# Patient Record
Sex: MALE | Race: WHITE | Employment: OTHER | URBAN - METROPOLITAN AREA
[De-identification: names, ages, dates, MRNs, and addresses within clinical notes are randomized per-mention and may not be internally consistent; named-entity substitution may affect disease eponyms.]

---

## 2020-12-19 ENCOUNTER — HOSPITAL ENCOUNTER (EMERGENCY)
Facility: CLINIC | Age: 50
Discharge: HOME | End: 2020-12-20
Attending: EMERGENCY MEDICINE
Payer: COMMERCIAL

## 2020-12-19 PROCEDURE — 99283 EMERGENCY DEPT VISIT LOW MDM: CPT

## 2020-12-19 PROCEDURE — 12015 RPR F/E/E/N/L/M 7.6-12.5 CM: CPT

## 2020-12-19 ASSESSMENT — PAIN DESCRIPTION - ORIENTATION: ORIENTATION: RIGHT

## 2020-12-19 ASSESSMENT — PAIN SCALES - GENERAL: PAINLEVEL_OUTOF10: 0

## 2020-12-19 ASSESSMENT — PAIN DESCRIPTION - LOCATION: LOCATION: EAR

## 2020-12-20 VITALS
HEART RATE: 100 BPM | HEIGHT: 71 IN | RESPIRATION RATE: 18 BRPM | BODY MASS INDEX: 28.7 KG/M2 | OXYGEN SATURATION: 97 % | WEIGHT: 205 LBS | SYSTOLIC BLOOD PRESSURE: 145 MMHG | TEMPERATURE: 98.2 F | DIASTOLIC BLOOD PRESSURE: 100 MMHG

## 2020-12-20 PROCEDURE — 6370000000 HC RX 637 (ALT 250 FOR IP): Performed by: EMERGENCY MEDICINE

## 2020-12-20 RX ORDER — DOXYCYCLINE 100 MG/1
100 CAPSULE ORAL ONCE
Status: COMPLETED | OUTPATIENT
Start: 2020-12-20 | End: 2020-12-20

## 2020-12-20 RX ORDER — DOXYCYCLINE HYCLATE 100 MG
100 TABLET ORAL 2 TIMES DAILY
Qty: 16 TABLET | Refills: 0 | Status: SHIPPED | OUTPATIENT
Start: 2020-12-20 | End: 2020-12-28

## 2020-12-20 RX ADMIN — DOXYCYCLINE 100 MG: 100 CAPSULE ORAL at 01:01

## 2020-12-20 ASSESSMENT — ENCOUNTER SYMPTOMS
SORE THROAT: 0
VOMITING: 0
DIARRHEA: 0
ABDOMINAL PAIN: 0
BACK PAIN: 0
NAUSEA: 0
SHORTNESS OF BREATH: 0

## 2020-12-20 NOTE — ED PROVIDER NOTES
INITIAL VITALS:  height is 5' 11\" (1.803 m) and weight is 93 kg (205 lb). His oral temperature is 98.2 °F (36.8 °C). His blood pressure is 145/100 (abnormal) and his pulse is 100. His respiration is 18 and oxygen saturation is 97%. Physical Exam   Constitutional: He appears well-developed and well-nourished. No distress. HENT:   Head: Normocephalic and atraumatic. Right Ear: External ear normal.   Left Ear: External ear normal.   Ears:    Laceration through the helix and into the antihelix of the right ear as shown on the picture. Also a second laceration as shown on the picture. The laceration through the helix did [place] through the cartilage approximately 0.5 cm. The laceration to the helix is 5 cm in total length. Some areas were macerated. The second laceration is 3 cm in total length. Minimally contaminated. Otherwise unremarkable exam.   Eyes: Lids are normal. Right eye exhibits no discharge. Left eye exhibits no discharge. Neck: No tracheal deviation present. Cardiovascular: Normal rate and regular rhythm. Pulmonary/Chest: Effort normal. No accessory muscle usage. No tachypnea. No respiratory distress. Abdominal: Soft. There is no abdominal tenderness. Neurological: He is alert. GCS eye subscore is 4. GCS verbal subscore is 5. GCS motor subscore is 6. Skin: Skin is warm and dry. Psychiatric: He has a normal mood and affect. His behavior is normal.           DIFFERENTIAL DIAGNOSIS/ MDM:     Plan to be to thoroughly cleanse and suture the wound. I did speak with Dr. Rosa Maria Todd on-call for plastics. He did see pictures of the wound. He is comfortable with me suturing and he will follow up with the patient in the office. He did asked that we put the patient antibiotics as well and doxycycline was chosen. Patient is allergic to penicillin. I do not feel any imaging is necessary at this time.     DIAGNOSTIC RESULTS EKG: All EKG's are interpreted by the Emergency Department Physician who either signs or Co-signs this chart in the absence of a cardiologist.        RADIOLOGY:   I directly visualized the following  images and reviewed the radiologist interpretations:  No orders to display       No results found. LABS:  No results found for this visit on 12/19/20. EMERGENCY DEPARTMENT COURSE:     The patient was given the following medications:  Orders Placed This Encounter   Medications    doxycycline hyclate (VIBRA-TABS) 100 MG tablet     Sig: Take 1 tablet by mouth 2 times daily for 8 days     Dispense:  16 tablet     Refill:  0    doxycycline monohydrate (MONODOX) capsule 100 mg     Order Specific Question:   Antimicrobial Indications     Answer:   Skin and Soft Tissue Infection        Vitals:    Vitals:    12/19/20 2250 12/20/20 0059   BP: (!) 197/102 (!) 145/100   Pulse: 104 100   Resp: 18 18   Temp: 98.2 °F (36.8 °C)    TempSrc: Oral    SpO2: 95% 97%   Weight: 93 kg (205 lb)    Height: 5' 11\" (1.803 m)      -------------------------  BP: (!) 145/100, Temp: 98.2 °F (36.8 °C), Pulse: 100, Resp: 18      Re-evaluation Notes    Patient tolerated the procedure very well.  20 total sutures placed. Was a fairly complex laceration however good approximation was achieved throughout the entire ear. It was thoroughly cleansed with chlorhexidine and water. Patient advised to follow-up with plastic surgery given follow-up information. Advised to watch for signs of infection. Advised return for any new or concerning symptoms. They are comfortable with the plan.     CONSULTS:    Plastic surgery    CRITICAL CARE:     None    PROCEDURES:    Lac Repair    Date/Time: 12/20/2020 12:00 AM  Performed by: Verena Bauer DO  Authorized by: Verena Bauer DO     Consent:     Consent obtained:  Verbal    Consent given by:  Patient and spouse Risks discussed:  Need for additional repair, poor cosmetic result and poor wound healing  Anesthesia (see MAR for exact dosages): Anesthesia method:  Local infiltration    Local anesthetic:  Lidocaine 1% w/o epi (2ml)  Repair type:     Repair type: Intermediate  Pre-procedure details:     Preparation:  Patient was prepped and draped in usual sterile fashion  Treatment:     Area cleansed with:  Hibiclens    Amount of cleaning:  Extensive    Irrigation solution:  Sterile water    Irrigation method:  Pressure wash  Skin repair:     Repair method:  Sutures    Suture size:  5-0    Suture material:  Nylon    Suture technique:  Simple interrupted    Number of sutures:  20  Approximation:     Approximation:  Close  Post-procedure details:     Dressing:  Non-adherent dressing    Patient tolerance of procedure: Tolerated well, no immediate complications  Comments:      One 5-0 Vicryl suture was used to align and stabilize the cartilage. It was aligned very well. The rest of the sutures were 50 nylon. FINAL IMPRESSION      1.  Complex laceration of right ear, initial encounter          DISPOSITION/PLAN   DISPOSITION Decision To Discharge 12/20/2020 12:54:09 AM      Condition on Disposition    Improved    PATIENT REFERRED TO:  Cody Chambers MD  [address] [address]    Schedule an appointment as soon as possible for a visit       Justo Byrd MD  [address] [address] [id number] [phone]    Schedule an appointment as soon as possible for a visit in 3 days        DISCHARGE MEDICATIONS:  Discharge Medication List as of 12/20/2020 12:56 AM      START taking these medications    Details   doxycycline hyclate (VIBRA-TABS) 100 MG tablet Take 1 tablet by mouth 2 times daily for 8 days, Disp-16 tablet, R-0Print (Please note that portions of this note were completed with a voice recognition program.  Efforts were made to edit the dictations but occasionally words are mis-transcribed.)    Junior Moon DO  Attending Emergency Physician       Junior Moon DO  12/20/20 0206

## 2020-12-20 NOTE — ED NOTES
Contacted [facility] access for Ubaldo's, page is out for consult.       Francesca Sparrow RN  12/19/20 2148

## 2020-12-22 PROBLEM — S01.311A COMPLEX LACERATION OF RIGHT EAR: Status: ACTIVE | Noted: 2020-12-22

## 2022-01-11 ENCOUNTER — HOSPITAL ENCOUNTER (EMERGENCY)
Facility: CLINIC | Age: 52
Discharge: HOME | End: 2022-01-11
Attending: EMERGENCY MEDICINE
Payer: COMMERCIAL

## 2022-01-11 VITALS
BODY MASS INDEX: 28.7 KG/M2 | WEIGHT: 205 LBS | TEMPERATURE: 98.5 F | HEIGHT: 71 IN | DIASTOLIC BLOOD PRESSURE: 90 MMHG | OXYGEN SATURATION: 99 % | RESPIRATION RATE: 16 BRPM | SYSTOLIC BLOOD PRESSURE: 157 MMHG | HEART RATE: 73 BPM

## 2022-01-11 DIAGNOSIS — S05.02XA ABRASION OF LEFT CORNEA, INITIAL ENCOUNTER: Primary | ICD-10-CM

## 2022-01-11 PROCEDURE — 99284 EMERGENCY DEPT VISIT MOD MDM: CPT

## 2022-01-11 PROCEDURE — 6370000000 HC RX 637 (ALT 250 FOR IP)

## 2022-01-11 RX ORDER — NEOMYCIN SULFATE, POLYMYXIN B SULFATE AND GRAMICIDIN 1.75; 10000; .025 MG/ML; [USP'U]/ML; MG/ML
2 SOLUTION/ DROPS OPHTHALMIC ONCE
Status: COMPLETED | OUTPATIENT
Start: 2022-01-11 | End: 2022-01-11

## 2022-01-11 RX ORDER — TETRACAINE HYDROCHLORIDE 5 MG/ML
1 SOLUTION OPHTHALMIC ONCE
Status: COMPLETED | OUTPATIENT
Start: 2022-01-11 | End: 2022-01-11

## 2022-01-11 RX ADMIN — FLUORESCEIN SODIUM 1 EACH: 0.6 STRIP OPHTHALMIC at 16:47

## 2022-01-11 RX ADMIN — NEOMYCIN SULFATE, POLYMYXIN B SULFATE AND GRAMICIDIN 2 DROP: 1.75; 10000; .025 SOLUTION/ DROPS OPHTHALMIC at 16:53

## 2022-01-11 RX ADMIN — TETRACAINE HYDROCHLORIDE 1 DROP: 5 SOLUTION OPHTHALMIC at 16:47

## 2022-01-11 ASSESSMENT — PAIN SCALES - GENERAL: PAINLEVEL_OUTOF10: 1

## 2022-01-11 ASSESSMENT — PAIN DESCRIPTION - LOCATION: LOCATION: EYE

## 2022-01-11 ASSESSMENT — PAIN DESCRIPTION - ORIENTATION: ORIENTATION: LEFT

## 2022-01-11 NOTE — ED PROVIDER NOTES
[facility]  [address] [facility]  Phone: [phone]        Pt Name: Ria Lopez  MRN: [id number]  [place] 1970  Date of evaluation: 1/11/22    [address] [place] [address]       Chief Complaint   Patient presents with    Eye Pain       HISTORY OF PRESENT ILLNESS (Location/Symptom, Timing/Onset, Context/Setting, Quality, Duration, Modifying Factors, Severity)      Ria Lopez is a 46 y.o. male presents to the ED via private auto with plaints of left eye pain. He states that about 5 hours ago he was sitting in his office doing paperwork when he noticed his left eye started to feel irritated. He denies any specific injury. He reports a foreign body sensation with constant irritation and tearing. He denies blurred vision. He is up-to-date on his tetanus. PAST MEDICAL / SURGICAL / SOCIAL / FAMILY HISTORY     PMH:  has a past medical history of COVID-19 and Hypertension. Surgical History:  has a past surgical history that includes Hand tendon surgery (Left). Social History:  reports that he has never smoked. He has quit using smokeless tobacco.  His smokeless tobacco use included chew. He reports current alcohol use. He reports that he does not use drugs. Family History: has no family status information on file. family history is not on file. Psychiatric History: None    Allergies: Penicillins    Home Medications:   Prior to Admission medications    Medication Sig Start Date End Date Taking? Authorizing Provider   Multiple Vitamins-Minerals (THERAPEUTIC MULTIVITAMIN-MINERALS) tablet Take 1 tablet by mouth daily    Historical Provider, MD       REVIEW OF SYSTEMS  (2-9 systems for level 4, 10 ormore for level 5)      Review of Systems    Constitutional: See HPI. Denies fever or chills. Eyes: Reports left eye irritation with foreign body sensation. Denies vision changes. HENT: Denies sore throat or neck pain.    Respiratory: Denies cough or shortness of breath. Cardiovascular: Denies chest pain. GI: Denies vomiting or diarrhea. : Denies painful urination. Musculoskeletal: Denies recent trauma. Skin: Denies new rashes or wounds. Neurologic:  Denies new numbness or weakness. Psychiatric: Denies sleep disturbances. Endocrine:  Denies unexpected weight loss  Heme: Denies bleeding disorders. All other systems negative except as marked. PHYSICAL EXAM  (up to 7 for level 4, 8 or more for level 5)      INITIAL VITALS:  height is 5' 11\" (1.803 m) and weight is 93 kg (205 lb). His temperature is 98.5 °F (36.9 °C). His blood pressure is 157/90 (abnormal) and his pulse is 73. His respiration is 16 and oxygen saturation is 99%. Vital signs reviewed. Physical Exam    General:  Alert, cooperative, well-groomed, well-nourished, appears stated age, and is in no acute distress. Head:  Normocephalic, atraumatic, and without obvious abnormality. Eyes:  Sclerae/conjunctivae clear without injection, pallor, or icterus. Corneas clear without opacities. EOM's intact. ENT: Ears and nose are all without obvious masses lesion or deformity. Neck: Supple and symmetrical. Trachea midline. No adenopathy. No jugular venous distention. Lungs:   No respiratory distress. Clear to auscultation bilaterally. No wheezes, rhonchi, or rales. Heart:  Regular rate. Regular rhythm. No murmurs, rubs, or gallops. Abdomen:   Normoactive bowel sounds. Soft, nontender, nondistended without guarding or rebound. No palpable masses. No CVA tenderness. Extremities: Warm and dry without erythema or edema. Skin: Soft, good turgor, and well-hydrated. No obvious rashes or lesions. Neurologic: GCS is 15 and no focal deficits are appreciated. Normal gait. Grossly normal motor and sensation. Speech clear. Psychiatric: Normal mood and affect. Normal behavior. Coherent thought process.      DIFFERENTIAL DIAGNOSIS / MDM   Corneal abrasion versus foreign body to left eye    PLAN (LABS / IMAGING / EKG):  No orders of the defined types were placed in this encounter. MEDICATIONS ORDERED:  Orders Placed This Encounter   Medications    tetracaine (TETRAVISC) 0.5 % ophthalmic solution 1 drop    fluorescein ophthalmic strip 1 each    neomycin-polymyxin-gramicidin (NEOSPORIN) 1.75-52779-. 025 ophthalmic solution 2 drop       Controlled Substances Monitoring:     DIAGNOSTIC RESULTS     EKG: All EKG's are interpreted by the Emergency Department Physician who either signs or Co-signs this chart in the [address] a cardiologist.  Not indicated    RADIOLOGY: All images are read by the radiologist and their interpretations are reviewed. No results found. LABS:  No results found for this visit on 01/11/22. EMERGENCY DEPARTMENT COURSE     Will complete eye examination with fluorescein, tetracaine and [place] lamp. Corneal abrasion noted to left eye at 9:00 per [facility] lamp exam.  Will treat with Neosporin eyedrops 2 drops to left eye 4 times daily for 5 days will initiate in the emergency room. We discussed taking ibuprofen/acetaminophen for pain control and following up with primary care physician or ophthalmologist for continued complaints or increased blurred vision. Vitals:    Vitals:    01/11/22 1627   BP: (!) 157/90   Pulse: 73   Resp: 16   Temp: 98.5 °F (36.9 °C)   SpO2: 99%   Weight: 93 kg (205 lb)   Height: 5' 11\" (1.803 m)     -------------------------  BP: (!) 157/90, Temp: 98.5 °F (36.9 °C), Pulse: 73, Resp: 16      RE-EVALUATION:  See ED Course notes above. The patient and/or family and I have discussed the diagnosis and risks, and we agree with discharging home to follow-up with their pertinent providers. The patient appears stable for discharge and has been instructed to return immediately for new concerning symptoms or if the symptoms worsen in any way.  The patient understands that at this time there is no evidence for a more malignant underlying process, but the patient also understands that early in the process of an illness or injury, an emergency department workup can be falsely reassuring. Routine discharge counseling was given, and the patient understands that worsening, changing or persistent symptoms should prompt an immediate call or follow up with their primary physician or return to the emergency department. I have reviewed the disposition diagnosis with the patient and or their family/guardian. I have answered their questions and given discharge instructions. They voiced understanding of these instructions and did not have any further questions or complaints. This patient was seen by the attending physician and they agreed with the assessment and plan. CONSULTS:  None    PROCEDURES:  None    FINAL IMPRESSION      1. Abrasion of left cornea, initial encounter          DISPOSITION / PLAN     CONDITION ON DISPOSITION:   Good / Stable for discharge.      PATIENT REFERRED TO:  Lydia Wiggins MD  [address] [address] [address]    Call in 2 days      [facility]  / [facility]  [phone]    As needed      DISCHARGE MEDICATIONS:  Discharge Medication List as of 1/11/2022  4:56 PM          SUSHMA Hicks CNP   Emergency Medicine Physician Assistant    (Please note that portions of this note were completed with a voice recognition program.  Efforts were made to edit the dictations but occasionally words aremis-transcribed.)      SUSHMA Hicks CNP  01/11/22 2309

## 2022-01-11 NOTE — ED TRIAGE NOTES
Pt c/o pain in L eye, noticed sensation around 1100, hx of blurred vision, attempted to flush eye with clear eye solution

## 2022-01-11 NOTE — ED PROVIDER NOTES
Attending Supervisory Note/Shared Visit   I have personally performed a face to face diagnostic evaluation on this patient. I have reviewed the mid-levels findings and agree.   History and Exam by me shows an alert and oriented patient seen with extender I agree with the assessment treatment plan and disposition      (Please note that portions of this note were completed with a voice recognition program.  Efforts were made to edit the dictations but occasionally words are mis-transcribed.)    Berkley Pennington MD  Attending Emergency Physician      Berkley Pennington MD  01/11/22 [phone]

## 2023-11-21 ENCOUNTER — HOSPITAL ENCOUNTER (EMERGENCY)
Facility: CLINIC | Age: 53
Discharge: HOME | End: 2023-11-21
Attending: EMERGENCY MEDICINE
Payer: COMMERCIAL

## 2023-11-21 VITALS
HEART RATE: 74 BPM | TEMPERATURE: 98.7 F | SYSTOLIC BLOOD PRESSURE: 171 MMHG | WEIGHT: 200 LBS | RESPIRATION RATE: 18 BRPM | OXYGEN SATURATION: 96 % | BODY MASS INDEX: 28 KG/M2 | DIASTOLIC BLOOD PRESSURE: 101 MMHG | HEIGHT: 71 IN

## 2023-11-21 DIAGNOSIS — S80.862A TICK BITE OF LEFT LOWER LEG, INITIAL ENCOUNTER: Primary | ICD-10-CM
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
## 2023-11-21 DIAGNOSIS — L03.116 CELLULITIS OF LEFT LOWER EXTREMITY: ICD-10-CM

## 2023-11-21 PROCEDURE — 99283 EMERGENCY DEPT VISIT LOW MDM: CPT

## 2023-11-21 PROCEDURE — 6370000000 HC RX 637 (ALT 250 FOR IP): Performed by: EMERGENCY MEDICINE

## 2023-11-21 RX ORDER — LISINOPRIL 5 MG/1
5 TABLET ORAL DAILY
COMMUNITY

## 2023-11-21 RX ORDER — DOXYCYCLINE 100 MG/1
200 CAPSULE ORAL ONCE
Status: COMPLETED | OUTPATIENT
Start: 2023-11-21 | End: 2023-11-21

## 2023-11-21 RX ORDER — DOXYCYCLINE HYCLATE 100 MG
100 TABLET ORAL 2 TIMES DAILY
Qty: 20 TABLET | Refills: 0 | Status: SHIPPED | OUTPATIENT
Start: 2023-11-21 | End: 2023-12-01

## 2023-11-21 RX ADMIN — DOXYCYCLINE 200 MG: 100 CAPSULE ORAL at 08:32

## 2023-11-21 ASSESSMENT — PAIN - FUNCTIONAL ASSESSMENT: PAIN_FUNCTIONAL_ASSESSMENT: NONE - DENIES PAIN

## 2023-11-21 NOTE — DISCHARGE INSTRUCTIONS
Take medications as prescribed    Return immediately if any worsening symptoms or any other concerns    Tell us how we did visit: [email or web address]   and let us know about your experience

## 2023-11-21 NOTE — ED NOTES
Pt presents to ED c/o tick bite of left leg on back of knee. 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 Pt states he first noticed the tick this morning while in the shower and immediately removed it. Pt denies pain but area appears slightly red/swollen. No drainage noted. Pt arrives A/Ox4, afebrile, PWD, PMS intact. Pt resting on stretcher with call light in reach.      Scott Preciado RN  11/21/23 [id number]       Scott Preciado RN  11/21/23 0900

## 2023-11-21 NOTE — ED PROVIDER NOTES
[facility]  [address]  Phone: [phone] [facility] [place]      Pt Name: Kia Nunez  MRN: [id number]  [address] [facility] 1970  Date of evaluation: 11/21/2023    CHIEF COMPLAINT       Chief Complaint   Patient presents with    Insect Bite       HISTORY OF PRESENT ILLNESS    Kia Nunez is a 52 y.o. male who presents to the emergency department with a tick bite back of his left leg. He was dealing with ticks all weekend and pulled multiple off of himself however the one he pulled off today had been there for couple of days. He now has redness and a small purpleish area behind his left knee. He admits to leg stiffness. He denies any other symptoms. No fevers or chills no chest pain or shortness of breath no nausea vomiting. REVIEW OF SYSTEMS       Constitutional: No fevers or chills   HENT: No sore throat, rhinorrhea, or earache   Eyes: No blurry vision or double vision no drainage   Cardiovascular: No chest pain or tachycardia   Respiratory: No wheezing or shortness of breath no cough   Gastrointestinal: No nausea, vomiting, diarrhea, constipation, or abdominal pain   : No hematuria or dysuria   Musculoskeletal: Left leg pain  Skin: Left leg rash  Neurological: No focal neurologic complaints, paresthesias, weakness, or headache     PAST MEDICAL HISTORY    has a past medical history of COVID-19 and Hypertension. SURGICAL HISTORY      has a past surgical history that includes Hand tendon surgery (Left). CURRENT MEDICATIONS       Previous Medications    LISINOPRIL (PRINIVIL;ZESTRIL) 5 MG TABLET    Take 1 tablet by mouth daily    MULTIPLE VITAMINS-MINERALS (THERAPEUTIC MULTIVITAMIN-MINERALS) TABLET    Take 1 tablet by mouth daily       ALLERGIES     is allergic to penicillins. FAMILY HISTORY     has no family status information on file. family history is not on file. SOCIAL HISTORY      reports that he has never smoked.  He has quit using